# Patient Record
Sex: MALE | Race: WHITE | NOT HISPANIC OR LATINO | URBAN - METROPOLITAN AREA
[De-identification: names, ages, dates, MRNs, and addresses within clinical notes are randomized per-mention and may not be internally consistent; named-entity substitution may affect disease eponyms.]

---

## 2018-08-16 ENCOUNTER — OFFICE VISIT (OUTPATIENT)
Dept: URBAN - METROPOLITAN AREA CLINIC 193 | Facility: CLINIC | Age: 73
End: 2018-08-16
Payer: MEDICARE

## 2018-08-16 PROCEDURE — 92012 INTRM OPH EXAM EST PATIENT: CPT | Performed by: OPTOMETRIST

## 2018-08-16 ASSESSMENT — INTRAOCULAR PRESSURE
OS: 15
OD: 14

## 2018-08-16 NOTE — IMPRESSION/PLAN
Impression: Diagnosis: Bell's palsy. Code: G51.0. Left. Near normal. Plan: Discussed. Continue care with PCP. Call if worsens.

## 2018-08-16 NOTE — IMPRESSION/PLAN
Impression: Diagnosis: Age-related nuclear cataract, bilateral. Code: H25.13. undilated today. pt declined. Plan: return for DE with vision insurance. Explained that yearly DE most important. Not required to get new glasses exam every year.

## 2018-08-16 NOTE — IMPRESSION/PLAN
Impression: Diagnosis: Punctate keratitis, left eye. Code: P09.159. Exposure from Bell's Palsy. Incomplete blink. Uses ATs. Plan: Continue ATs.  Gel best.

## 2018-09-14 ENCOUNTER — OFFICE VISIT (OUTPATIENT)
Dept: URBAN - METROPOLITAN AREA CLINIC 193 | Facility: CLINIC | Age: 73
End: 2018-09-14
Payer: MEDICARE

## 2018-09-14 DIAGNOSIS — H25.13 AGE-RELATED NUCLEAR CATARACT, BILATERAL: Primary | ICD-10-CM

## 2018-09-14 DIAGNOSIS — G51.0 BELL'S PALSY: ICD-10-CM

## 2018-09-14 PROCEDURE — 99214 OFFICE O/P EST MOD 30 MIN: CPT | Performed by: OPTOMETRIST

## 2018-09-14 ASSESSMENT — INTRAOCULAR PRESSURE
OS: 16
OD: 16

## 2018-09-14 ASSESSMENT — VISUAL ACUITY
OS: 20/30-
OD: 20/30+

## 2018-09-14 NOTE — IMPRESSION/PLAN
Impression: Diagnosis: Punctate keratitis, left eye. Code: S69.254. Exposure from Bell's Palsy. Incomplete blink. Uses ATs. Plan: Continue ATs.  Gel best.

## 2018-09-14 NOTE — IMPRESSION/PLAN
Impression: Diagnosis: Age-related nuclear cataract, bilateral. Code: H25.13. Plan: Cataracts affecting vision some, but no surgery is currently recommended. The patient will monitor vision changes and contact us with any decrease in vision. Continue to monitor.

## 2018-09-14 NOTE — IMPRESSION/PLAN
Impression: Diagnosis: Bell's palsy. Code: G51.0. Left. Near normal. Eye cervantes Plan: Discussed. Continue care with PCP. Call if worsens.

## 2020-01-09 ENCOUNTER — OFFICE VISIT (OUTPATIENT)
Dept: URBAN - METROPOLITAN AREA CLINIC 71 | Facility: CLINIC | Age: 75
End: 2020-01-09
Payer: MEDICARE

## 2020-01-09 DIAGNOSIS — H16.142 PUNCTATE KERATITIS, LEFT EYE: ICD-10-CM

## 2020-01-09 PROCEDURE — 92014 COMPRE OPH EXAM EST PT 1/>: CPT | Performed by: OPTOMETRIST

## 2020-01-09 ASSESSMENT — INTRAOCULAR PRESSURE
OS: 14
OD: 15

## 2020-01-09 NOTE — IMPRESSION/PLAN
Impression: Presbyopia: H52.4. no refraction today. Decrease NVA. Glare at night. Plan: pt plans to return for refraction with vision insurance. Include BAT.

## 2020-01-09 NOTE — IMPRESSION/PLAN
Impression: Diagnosis: Age-related nuclear cataract, bilateral. Code: H25.13. mildly symptomatic OU Plan: discussed. Cataracts affecting vision some, but no surgery is currently recommended. The patient will monitor vision changes and contact us with any decrease in vision. Continue to monitor.  Glare test at next exam.

## 2020-01-09 NOTE — IMPRESSION/PLAN
Impression: Diagnosis: Punctate keratitis, left eye. Code: C23.444. Exposure from Bell's Palsy. Incomplete blink. Uses Refresh tears PRN Plan: Continue Refresh tears. Gel best. Blink more completely and more frequently.

## 2020-01-13 ENCOUNTER — OFFICE VISIT (OUTPATIENT)
Dept: URBAN - METROPOLITAN AREA CLINIC 71 | Facility: CLINIC | Age: 75
End: 2020-01-13
Payer: COMMERCIAL

## 2020-01-13 DIAGNOSIS — H52.4 PRESBYOPIA: Primary | ICD-10-CM

## 2020-01-13 PROCEDURE — 92012 INTRM OPH EXAM EST PATIENT: CPT | Performed by: OPTOMETRIST

## 2020-01-13 ASSESSMENT — INTRAOCULAR PRESSURE
OD: 11
OS: 13

## 2020-01-13 ASSESSMENT — VISUAL ACUITY
OD: 20/30+
OS: 20/30-

## 2020-01-13 NOTE — IMPRESSION/PLAN
Impression: Presbyopia: H52.4. Plan: A glasses prescription has been discussed and generated. Patient to call with any concerns. Adaptation needed for astig change OD.

## 2021-09-17 ENCOUNTER — OFFICE VISIT (OUTPATIENT)
Dept: URBAN - METROPOLITAN AREA CLINIC 71 | Facility: CLINIC | Age: 76
End: 2021-09-17
Payer: MEDICARE

## 2021-09-17 PROCEDURE — 92014 COMPRE OPH EXAM EST PT 1/>: CPT | Performed by: OPTOMETRIST

## 2021-09-17 ASSESSMENT — INTRAOCULAR PRESSURE
OD: 10
OS: 14

## 2021-09-17 NOTE — IMPRESSION/PLAN
Impression: Diagnosis: Punctate keratitis, left eye. Code: W76.745. Exposure from Bell's Palsy. Incomplete blink. Uses Refresh tears PRN Plan: Continue Refresh tears. Blink more completely and more frequently. DISPLAY PLAN FREE TEXT DISPLAY PLAN FREE TEXT DISPLAY PLAN FREE TEXT DISPLAY PLAN FREE TEXT DISPLAY PLAN FREE TEXT DISPLAY PLAN FREE TEXT DISPLAY PLAN FREE TEXT DISPLAY PLAN FREE TEXT DISPLAY PLAN FREE TEXT DISPLAY PLAN FREE TEXT DISPLAY PLAN FREE TEXT DISPLAY PLAN FREE TEXT DISPLAY PLAN FREE TEXT DISPLAY PLAN FREE TEXT DISPLAY PLAN FREE TEXT DISPLAY PLAN FREE TEXT DISPLAY PLAN FREE TEXT DISPLAY PLAN FREE TEXT DISPLAY PLAN FREE TEXT DISPLAY PLAN FREE TEXT DISPLAY PLAN FREE TEXT DISPLAY PLAN FREE TEXT DISPLAY PLAN FREE TEXT DISPLAY PLAN FREE TEXT DISPLAY PLAN FREE TEXT DISPLAY PLAN FREE TEXT DISPLAY PLAN FREE TEXT DISPLAY PLAN FREE TEXT DISPLAY PLAN FREE TEXT DISPLAY PLAN FREE TEXT DISPLAY PLAN FREE TEXT DISPLAY PLAN FREE TEXT DISPLAY PLAN FREE TEXT DISPLAY PLAN FREE TEXT DISPLAY PLAN FREE TEXT DISPLAY PLAN FREE TEXT DISPLAY PLAN FREE TEXT DISPLAY PLAN FREE TEXT DISPLAY PLAN FREE TEXT DISPLAY PLAN FREE TEXT DISPLAY PLAN FREE TEXT DISPLAY PLAN FREE TEXT DISPLAY PLAN FREE TEXT DISPLAY PLAN FREE TEXT DISPLAY PLAN FREE TEXT DISPLAY PLAN FREE TEXT DISPLAY PLAN FREE TEXT DISPLAY PLAN FREE TEXT DISPLAY PLAN FREE TEXT DISPLAY PLAN FREE TEXT DISPLAY PLAN FREE TEXT DISPLAY PLAN FREE TEXT DISPLAY PLAN FREE TEXT DISPLAY PLAN FREE TEXT DISPLAY PLAN FREE TEXT DISPLAY PLAN FREE TEXT DISPLAY PLAN FREE TEXT DISPLAY PLAN FREE TEXT DISPLAY PLAN FREE TEXT DISPLAY PLAN FREE TEXT DISPLAY PLAN FREE TEXT DISPLAY PLAN FREE TEXT DISPLAY PLAN FREE TEXT DISPLAY PLAN FREE TEXT DISPLAY PLAN FREE TEXT

## 2021-09-17 NOTE — IMPRESSION/PLAN
Impression: Diagnosis: Age-related nuclear cataract, bilateral. Code: H25.13. Pt happy with vision. Plan: Cataracts affecting vision some, but no surgery is currently recommended. The patient will monitor vision changes and contact us with any decrease in vision. Continue to monitor.

## 2022-11-03 ENCOUNTER — OFFICE VISIT (OUTPATIENT)
Dept: URBAN - METROPOLITAN AREA CLINIC 71 | Facility: CLINIC | Age: 77
End: 2022-11-03
Payer: COMMERCIAL

## 2022-11-03 DIAGNOSIS — H25.13 AGE-RELATED NUCLEAR CATARACT, BILATERAL: ICD-10-CM

## 2022-11-03 DIAGNOSIS — G51.0 BELL'S PALSY: ICD-10-CM

## 2022-11-03 DIAGNOSIS — H52.4 PRESBYOPIA: Primary | ICD-10-CM

## 2022-11-03 PROCEDURE — 92014 COMPRE OPH EXAM EST PT 1/>: CPT | Performed by: OPTOMETRIST

## 2022-11-03 ASSESSMENT — VISUAL ACUITY
OD: 20/20
OS: 20/25

## 2022-11-03 ASSESSMENT — INTRAOCULAR PRESSURE
OS: 16
OD: 15

## 2022-11-03 ASSESSMENT — KERATOMETRY
OS: 40.75
OD: 41.13

## 2022-11-03 NOTE — IMPRESSION/PLAN
Impression: Presbyopia: H52.4. Plan: A glasses prescription has been discussed and generated. Working distance discussed with pt. Patient to call with any concerns.

## 2022-11-03 NOTE — IMPRESSION/PLAN
Impression: Diagnosis: Bell's palsy. Code: G51.0. Left. Near normal. Eye cervantes Plan: Continue care with PCP. Call if worsens.

## 2022-11-03 NOTE — IMPRESSION/PLAN
Impression: Diagnosis: Age-related nuclear cataract, bilateral. Code: H25.13. Pt happy with vision. Plan: Cataracts affecting vision some, but still no surgery is currently recommended. The patient will monitor vision changes and contact us with any decrease in vision. Continue to monitor.

## 2023-11-02 ENCOUNTER — OFFICE VISIT (OUTPATIENT)
Dept: URBAN - METROPOLITAN AREA CLINIC 71 | Facility: CLINIC | Age: 78
End: 2023-11-02
Payer: MEDICARE

## 2023-11-02 DIAGNOSIS — G51.0 BELL'S PALSY: ICD-10-CM

## 2023-11-02 DIAGNOSIS — H25.13 AGE-RELATED NUCLEAR CATARACT, BILATERAL: Primary | ICD-10-CM

## 2023-11-02 PROCEDURE — 99213 OFFICE O/P EST LOW 20 MIN: CPT | Performed by: OPTOMETRIST

## 2023-11-02 ASSESSMENT — KERATOMETRY
OS: 41.00
OD: 41.13

## 2023-11-02 ASSESSMENT — INTRAOCULAR PRESSURE
OD: 13
OS: 17

## 2024-07-05 ENCOUNTER — OFFICE VISIT (OUTPATIENT)
Dept: URBAN - METROPOLITAN AREA CLINIC 71 | Facility: CLINIC | Age: 79
End: 2024-07-05
Payer: MEDICARE

## 2024-07-05 DIAGNOSIS — H01.009 BLEPHARITIS OF EYELID: Primary | ICD-10-CM

## 2024-07-05 DIAGNOSIS — D23.10 OTHER BENIGN NEOPLASM OF SKIN OF EYELID INCLUDING CANTHUS: ICD-10-CM

## 2024-07-05 PROCEDURE — 99214 OFFICE O/P EST MOD 30 MIN: CPT

## 2024-07-05 ASSESSMENT — INTRAOCULAR PRESSURE
OS: 15
OD: 13

## 2024-09-30 ENCOUNTER — OFFICE VISIT (OUTPATIENT)
Dept: URBAN - METROPOLITAN AREA CLINIC 71 | Facility: CLINIC | Age: 79
End: 2024-09-30
Payer: MEDICARE

## 2024-09-30 DIAGNOSIS — D48.7 NEOPLASM OF UNCERTAIN BEHAVIOR OF EYE: Primary | ICD-10-CM

## 2024-09-30 DIAGNOSIS — H25.13 AGE-RELATED NUCLEAR CATARACT, BILATERAL: ICD-10-CM

## 2024-09-30 PROCEDURE — 92285 EXTERNAL OCULAR PHOTOGRAPHY: CPT | Performed by: OPHTHALMOLOGY

## 2024-09-30 PROCEDURE — 99202 OFFICE O/P NEW SF 15 MIN: CPT | Performed by: OPHTHALMOLOGY

## 2024-09-30 ASSESSMENT — INTRAOCULAR PRESSURE
OS: 17
OD: 9

## 2024-10-28 ENCOUNTER — OFFICE VISIT (OUTPATIENT)
Dept: URBAN - METROPOLITAN AREA CLINIC 71 | Facility: CLINIC | Age: 79
End: 2024-10-28
Payer: MEDICARE

## 2024-10-28 DIAGNOSIS — D48.7 NEOPLASM OF UNCERTAIN BEHAVIOR OF EYE: Primary | ICD-10-CM

## 2024-10-28 PROCEDURE — 67810 INCAL BX EYELID SKN LID MRGN: CPT | Performed by: OPHTHALMOLOGY

## 2024-10-28 ASSESSMENT — INTRAOCULAR PRESSURE
OS: 19
OD: 14

## 2024-11-26 ENCOUNTER — OFFICE VISIT (OUTPATIENT)
Dept: URBAN - METROPOLITAN AREA CLINIC 71 | Facility: CLINIC | Age: 79
End: 2024-11-26
Payer: COMMERCIAL

## 2024-11-26 DIAGNOSIS — H25.13 AGE-RELATED NUCLEAR CATARACT, BILATERAL: ICD-10-CM

## 2024-11-26 DIAGNOSIS — H04.123 DRY EYE SYNDROME OF BILATERAL LACRIMAL GLANDS: ICD-10-CM

## 2024-11-26 DIAGNOSIS — G51.0 BELL'S PALSY: ICD-10-CM

## 2024-11-26 DIAGNOSIS — H52.4 PRESBYOPIA: Primary | ICD-10-CM

## 2024-11-26 PROCEDURE — 92014 COMPRE OPH EXAM EST PT 1/>: CPT | Performed by: OPTOMETRIST

## 2024-11-26 ASSESSMENT — VISUAL ACUITY
OS: 20/30
OD: 20/25

## 2024-11-26 ASSESSMENT — INTRAOCULAR PRESSURE
OS: 20
OD: 14